# Patient Record
Sex: MALE | Race: ASIAN | Employment: UNEMPLOYED | URBAN - METROPOLITAN AREA
[De-identification: names, ages, dates, MRNs, and addresses within clinical notes are randomized per-mention and may not be internally consistent; named-entity substitution may affect disease eponyms.]

---
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
## 2023-08-15 ENCOUNTER — HOSPITAL ENCOUNTER (EMERGENCY)
Facility: HOSPITAL | Age: 18
Discharge: HOME | End: 2023-08-15
Attending: PEDIATRICS
Payer: MEDICAID

## 2023-08-15 VITALS
HEART RATE: 81 BPM | RESPIRATION RATE: 20 BRPM | DIASTOLIC BLOOD PRESSURE: 92 MMHG | SYSTOLIC BLOOD PRESSURE: 125 MMHG | TEMPERATURE: 98 F | BODY MASS INDEX: 23 KG/M2 | WEIGHT: 150 LBS | OXYGEN SATURATION: 100 %

## 2023-08-15 DIAGNOSIS — K04.7 DENTAL INFECTION: Primary | ICD-10-CM

## 2023-08-15 DIAGNOSIS — K08.409 HISTORY OF THIRD MOLAR TOOTH EXTRACTION, UNSPECIFIED EDENTULISM CLASS: ICD-10-CM

## 2023-08-15 PROCEDURE — 99283 EMERGENCY DEPT VISIT LOW MDM: CPT

## 2023-08-15 RX ORDER — AMOXICILLIN 500 MG/1
500 TABLET, FILM COATED ORAL 2 TIMES DAILY
Qty: 14 TABLET | Refills: 0 | Status: SHIPPED | OUTPATIENT
Start: 2023-08-15 | End: 2023-08-22

## 2023-08-15 NOTE — DISCHARGE INSTRUCTIONS
To treat for possible early infection, we will prescribe a course of antibiotics. Tylenol or Motrin as needed for pain.

## 2023-08-15 NOTE — ED INITIAL ASSESSMENT (HPI)
Called and spoke with patient. Informed of normal test results. Pt expressed understanding. Pt to the emergency room for right jaw problem. Pt states that 5 days ago he had 3 of his wisdom teeth removed (lower right, upper left, and lower left). Pt states that he was trying to look at his right side, because it hurt more than the others so he pulled his cheek out and it began to bleed. Pt is concerned that it is infected and that there is something wrong with it. No bleeding at this time. No fevers noted at home.

## 2024-02-13 ENCOUNTER — TELEMEDICINE (OUTPATIENT)
Facility: CLINIC | Age: 19
End: 2024-02-13
Payer: MEDICAID

## 2024-02-13 DIAGNOSIS — G47.10 HYPERSOMNIA: ICD-10-CM

## 2024-02-13 DIAGNOSIS — G47.33 OBSTRUCTIVE SLEEP APNEA: Primary | ICD-10-CM

## 2024-02-13 NOTE — PROGRESS NOTES
[facility] General Pulmonary Progress Note    History of Present Illness:  Spenser Nguyen is a 18 year old male   For over a year   Trouble falling asleep, has to listen to something other wise does not fall asleep  Despite of number of hours, dead tired, brain fog  In bed 12 wakes at 9-11  Spenser Nguyen is a 18 y.o. male with hx of GERD and anxiety presenting with concern for trouble falling asleep and staying asleep.     Past Medical History:   No past medical history on file.     Past Surgical History: No past surgical history on file.      Family Medical History: No family history on file.     Social History:   Social History     Socioeconomic History    Marital status: Single     Spouse name: Not on file    Number of children: Not on file    Years of education: Not on file    Highest education level: Not on file   Occupational History    Not on file   Tobacco Use    Smoking status: Never    Smokeless tobacco: Never   Substance and Sexual Activity    Alcohol use: Not on file    Drug use: Not on file    Sexual activity: Not on file   Other Topics Concern    Not on file   Social History Narrative    Not on file     Social Determinants of Health     Financial Resource Strain: Not on file   Food Insecurity: Not on file   Transportation Needs: Not on file   Physical Activity: Not on file   Stress: Not on file   Social Connections: Not on file   Housing Stability: Not on file        Medications:   Current Outpatient Medications   Medication Sig Dispense Refill    Esomeprazole Magnesium 40 MG Oral Capsule Delayed Release Take 40 mg by mouth every morning before breakfast.         Review of Systems: Review of Systems     Physical Exam:  There were no vitals taken for this visit.       Constitutional: alert, cooperative. No acute distress.  HEENT: Head NC/AT. Mask in place    Results:  Personally reviewed      Assessment/Plan:  1. Obstructive sleep apnea  Mild callie by his reports, order apap trial and followup 31-90 day  2.  Hypersomnia  No matter how much sleep he feels sleepy,  3. Insomnia --interrupted sleep, discussed variable schedule  And maintaining a consistent schedule        Annabella Taylor DO  2/13/2024    This visit is conducted using Telemedicine with live, interactive video and audio.    Patient has been referred to the [facility] website at [email or web address] to review the yearly Consent to Treat document.    Patient understands and accepts financial responsibility for any deductible, co-insurance and/or co-pays associated with this service.

## 2024-02-22 ENCOUNTER — TELEPHONE (OUTPATIENT)
Facility: CLINIC | Age: 19
End: 2024-02-22

## 2024-02-22 DIAGNOSIS — G47.33 OBSTRUCTIVE SLEEP APNEA: ICD-10-CM

## 2024-02-22 DIAGNOSIS — G47.10 HYPERSOMNIA: Primary | ICD-10-CM

## 2024-02-22 NOTE — TELEPHONE ENCOUNTER
Pt's prior sleep study report was received but is very blurry, we're awaiting a better copy but will be provided to Dr. Taylor for her review. Pt is seeking either a cpap titration or pap device, his request was unclear.

## 2024-02-23 NOTE — TELEPHONE ENCOUNTER
NOTE FROM DR TAYLOR  Pls order [id number]     Detailed mychart message sent, cpap machine ordered to [facility].  DME will verify insurance and once approved will contact pt to arrange delivery and instructions.  Pt instructed to follow up with Dr. Taylor once pt starts PAP therapy per insurance compliance requirement.   Pt to contact office with any additional questions or concerns.    [phone] (home)

## 2024-03-14 ENCOUNTER — TELEPHONE (OUTPATIENT)
Facility: CLINIC | Age: 19
End: 2024-03-14

## 2024-03-14 NOTE — TELEPHONE ENCOUNTER
Called Dr Holm's office to get office visit notes prior to pt's sleep study. Receptionis will review and fax medical record.  Will need to fax F2F notes to [facility] when received.

## 2024-04-03 ENCOUNTER — MED REC SCAN ONLY (OUTPATIENT)
Facility: CLINIC | Age: 19
End: 2024-04-03

## 2024-05-24 NOTE — PROGRESS NOTES
[facility] General Pulmonary Progress Note    History of Present Illness:  Spenser Nguyen is a 19 year old male   This is a 19 year old male who presents with the following symptoms, risk factors, behaviors or other items associated with sleep problems.    Sleep Apnea:   reflux during sleep; wakes with dry mouth; mouth breathing; nasal congestion; restless sleep; non-refreshing sleep; high blood pressure  Insomnia:  difficulty falling asleep; difficulty staying asleep; non-refreshing sleep; restless sleep; not getting enough sleep; irregular sleep schedule; mind racing; depresssion; anxiety; job stress  Restless Leg:  tingling or crawly feeling in the legs; urge to move is worse when seated or lying  Parasomnias:   No data recorded  Daytime Problems:  sleepiness; fatigue; irritable/freedman; dificulty concentrating; inattentiveness; memory problems; dozing off at school; previous sleep study    The patient's Conroy Sleepiness score is 0/24.  Since started using cpap  Waking up a lot easier  Still tired but a lot better  Able to take a nap now, before he was not able to  He is still sleepy  And lethargic  Stopped taking adderall and vyvance and nauseaus      Spenser Nguyen  03/15/2024 - 2024  Patient ID: [id number]  : 2005  Age: 19 years  Gender: Male  [address], [address]  Phone: [phone]  Fax: [phone]  Email: [email or web address]  Compliance Report  Compliance  Payor Standard  Usage 03/15/2024 - 2024  Usage days 70/70 days (100%)  >= 4 hours 52 days (74%)  < 4 hours 18 days (26%)  Usage hours 410 hours 48 minutes  Average usage (total days) 5 hours 52 minutes  Average usage (days used) 5 hours 52 minutes  Median usage (days used) 6 hours 12 minutes  Total used hours (value since last reset - 2024) 410 hours  AirSense 11 AutoSet  Serial number [id number]  Mode AutoSet  Min Pressure 5 cmH2O  Max Pressure 15 cmH2O  EPR Fulltime  EPR level 1  Response  Standard  Therapy  Pressure - cmH2O Median: 5.6 95th percentile: 7.7 Maximum: 8.7  Leaks - L/min Median: 0.0 95th percentile: 3.4 Maximum: 20.4  Events per hour AI: 2.2 HI: 0.1 AHI: 2.3  Apnea Index Central: 1.9 Obstructive: 0.2 Unknown: 0.1  RERA Index 0.0  Cheyne-Vernon respiration (average duration per night) 0 minutes (0%)      Past Medical History:   History reviewed. No pertinent past medical history.     Past Surgical History: History reviewed. No pertinent surgical history.      Family Medical History: History reviewed. No pertinent family history.     Social History:   Social History     Socioeconomic History    Marital status: Single     Spouse name: Not on file    Number of children: Not on file    Years of education: Not on file    Highest education level: Not on file   Occupational History    Not on file   Tobacco Use    Smoking status: Never    Smokeless tobacco: Never   Substance and Sexual Activity    Alcohol use: Not on file    Drug use: Not on file    Sexual activity: Not on file   Other Topics Concern    Not on file   Social History Narrative    Not on file     Social Determinants of Health     Financial Resource Strain: Not on file   Food Insecurity: Not on file   Transportation Needs: Not on file   Physical Activity: Not on file   Stress: Not on file   Social Connections: Unknown (3/14/2021)    Received from [facility], [facility]    Social Connections     Conversations with friends/family/neighbors per week: Not on file   Housing Stability: Low Risk  (7/9/2021)    Received from [facility], [facility]    Housing Stability     Mortgage Payment Concerns?: Not on file     Number of Places Lived in the Last Year: Not on file     Unstable Housing?: Not on file        Medications:   Current Outpatient Medications   Medication Sig Dispense Refill    Esomeprazole Magnesium 40 MG Oral Capsule Delayed Release Take 40 mg by  mouth every morning before breakfast.         Review of Systems: Review of Systems     Physical Exam:  There were no vitals taken for this visit.       Constitutional: alert, cooperative. No acute distress.  HEENT: Head NC/AT. Mask in place    Results:  Personally reviewed      Assessment/Plan:  1. Obstructive sleep apnea  Responding well to cpap, about 40-50% improvement    2. Hypersomnia  Some improvement   Did not tolerate vyvanse or adderall  Consider adding modafinil to discuss with his doctor  3. Gastroesophageal reflux disease without esophagitis        Annabella Taylor DO  5/28/2024    This visit is conducted using Telemedicine with live, interactive video and audio.    Patient has been referred to the [facility] website at [email or web address] to review the yearly Consent to Treat document.    Patient understands and accepts financial responsibility for any deductible, co-insurance and/or co-pays associated with this service.

## 2024-05-28 ENCOUNTER — TELEMEDICINE (OUTPATIENT)
Facility: CLINIC | Age: 19
End: 2024-05-28

## 2024-05-28 DIAGNOSIS — K21.9 GASTROESOPHAGEAL REFLUX DISEASE WITHOUT ESOPHAGITIS: ICD-10-CM

## 2024-05-28 DIAGNOSIS — G47.33 OBSTRUCTIVE SLEEP APNEA: Primary | ICD-10-CM

## 2024-05-28 DIAGNOSIS — G47.10 HYPERSOMNIA: ICD-10-CM

## 2024-05-28 PROCEDURE — 99214 OFFICE O/P EST MOD 30 MIN: CPT | Performed by: OTHER

## 2024-09-23 NOTE — PROGRESS NOTES
[facility] PULMONARY  SLEEP PROGRESS NOTE        HPI:   This is a 19 year old male coming in for   Chief Complaint   Patient presents with    Obstructive Sleep Apnea (KAREN)     Dme: [facility]  Nasal mask       HPI: over the past couple of years  Extreme fatigue  Torture waking  Tired all day   No motivation to do anything  Sleeps through the night  Goes to bed 1-2 am, SL 1 hour, wakes around 3am or 5am and goes back to sleep  OOB 10-noon   No regular dreams  No sleep paralysis  Some daytime naps if had to wake early  Denies drowsy driving  He is not on medications currently    Spenser Nguyen  2024 - 2024  Patient ID: [id number]  : 2005  Age: 19 years  Gender: Male  [place]  [address]  [facility], [address]  Phone: [phone]  Fax: [phone]  Email: [email or web address]  Compliance Report  Compliance  Payor Standard  Usage 2024 - 2024  Usage days 77/90 days (86%)  >= 4 hours 57 days (63%)  < 4 hours 20 days (22%)  Usage hours 403 hours 44 minutes  Average usage (total days) 4 hours 29 minutes  Average usage (days used) 5 hours 15 minutes  Median usage (days used) 5 hours 38 minutes  Total used hours (value since last reset - 2024) 1,008 hours  AirSense 11 AutoSet  Serial number [id number]  Mode AutoSet  Min Pressure 5 cmH2O  Max Pressure 15 cmH2O  EPR Fulltime  EPR level 1  Response Standard  Therapy  Pressure - cmH2O Median: 5.6 95th percentile: 7.7 Maximum: 9.3  Leaks - L/min Median: 0.1 95th percentile: 4.5 Maximum: 16.8  Events per hour AI: 1.7 HI: 0.2 AHI: 1.9  Apnea Index Central: 1.4 Obstructive: 0.2 Unknown: 0.0  RERA Index 0.0  Cheyne-Vernon respiration (average duration per night) 0 minutes (0%)  Patient: Sleep review of systems today: see form.      Pt  PCP:  EUGENE ROCHA  No referring provider defined for this encounter.           No data to display                    History reviewed. No pertinent past medical history.  History reviewed. No pertinent  surgical history.  Social History:  Social History     Social History Narrative    Not on file     Social History     Socioeconomic History    Marital status: Single   Tobacco Use    Smoking status: Never    Smokeless tobacco: Never     Social Determinants of Health      Received from [facility], [facility]    Social Connections    Received from [facility], [facility]    Housing Stability     Family History:  History reviewed. No pertinent family history.  Allergies:  No Known Allergies  Current Meds:  Current Outpatient Medications   Medication Sig Dispense Refill    Esomeprazole Magnesium 40 MG Oral Capsule Delayed Release Take 40 mg by mouth every morning before breakfast. (Patient not taking: Reported on 9/24/2024)        Counseling given: Not Answered         Problem List:  Patient Active Problem List   Diagnosis    Obstructive sleep apnea    Hypersomnia       REVIEW OF SYSTEMS:   Review of Systems    EXAM:   /68 (BP Location: Right arm, Patient Position: Sitting, Cuff Size: adult)   Pulse 76   Resp 16   Ht 5' 9\" (1.753 m)   Wt 163 lb (73.9 kg)   SpO2 100%   BMI 24.07 kg/m²  Estimated body mass index is 24.07 kg/m² as calculated from the following:    Height as of this encounter: 5' 9\" (1.753 m).    Weight as of this encounter: 163 lb (73.9 kg).   Neck in inches:      Wt Readings from Last 6 Encounters:   09/24/24 163 lb (73.9 kg)   08/15/23 150 lb (68 kg)   09/06/19 125 lb (56.7 kg) (28%, Z= -0.58)*     * Growth percentiles are based on [facility] (Boys, 2-20 Years) data.     BP Readings from Last 3 Encounters:   09/24/24 112/68   08/15/23 (!) 125/92   09/07/19 110/68 (35%, Z = -0.39 /  59%, Z = 0.23)*     *BP percentiles are based on the 2017 AAP Clinical Practice Guideline for boys     Pulse Readings from Last 3 Encounters:   09/24/24 76   08/15/23 81   09/07/19 70     SpO2 Readings from Last 3 Encounters:   09/24/24 100%    08/15/23 100%   09/07/19 100%      Patient weight not recorded    Vital signs reviewed.  Physical Exam    ASSESSMENT AND PLAN:   1. Hypersomnia  Perform MSLT after cpap titration study  Followup to review  2. Obstructive sleep apnea  Mild overall, RDI severe in REM 37  Not responding to cpap  No significant improvement in daytime fatigue  There are no Patient Instructions on file for this visit.    Independent interpretation of Sleep Download as defined above.  Continue with Rx management of Sleep apnea with PAP therapy.    COMPLIANCE is required by insurance for 4 hours a night most nights of the week.    Advised if still with sleep apnea and not using CPAP has a 7 fold increase in risk of heart attack, stroke, abnormal heart rhythm  and death,  increased risk of driving accidents.     Advised to refrain from driving when sleepy.      Recommend weight loss, and maintain and optimal BMI with Exercise 30 minutes most days to target heart rate .     Advised patient to change filters,masks,hoses  and tubes and equiptment on a  regular schedule.    Filters and seals shall be changed every 1 month,  Hoses every 3 months,   CPAP mask and humidifier chamber changed every 6 month  with the durable medical equipment provider.         Meds & Refills for this Visit:  Requested Prescriptions      No prescriptions requested or ordered in this encounter       Outcome: Parent verbalizes understanding. Parent is notified to call with any questions, complications, allergies, or worsening or changing symptoms.  Parent is to call with any side effects or complications from the treatments as a result of today.     \" This note was created utilizing Dragon speech recognition software.  Please excuse any grammatical errors. Call my office if you have any questions regarding this note. \"     Paris GIBSON LPN  9/23/2024  2:02 PM

## 2024-09-24 ENCOUNTER — OFFICE VISIT (OUTPATIENT)
Facility: CLINIC | Age: 19
End: 2024-09-24
Payer: MEDICAID

## 2024-09-24 VITALS
WEIGHT: 163 LBS | DIASTOLIC BLOOD PRESSURE: 68 MMHG | OXYGEN SATURATION: 100 % | RESPIRATION RATE: 16 BRPM | HEIGHT: 69 IN | BODY MASS INDEX: 24.14 KG/M2 | SYSTOLIC BLOOD PRESSURE: 112 MMHG | HEART RATE: 76 BPM

## 2024-09-24 DIAGNOSIS — G47.33 OBSTRUCTIVE SLEEP APNEA: ICD-10-CM

## 2024-09-24 DIAGNOSIS — G47.10 HYPERSOMNIA: Primary | ICD-10-CM

## 2024-09-24 PROCEDURE — 99214 OFFICE O/P EST MOD 30 MIN: CPT | Performed by: OTHER

## 2024-11-01 ENCOUNTER — TELEPHONE (OUTPATIENT)
Facility: CLINIC | Age: 19
End: 2024-11-01

## 2024-11-04 NOTE — TELEPHONE ENCOUNTER
[employer] sent fax to billing \"denying due to lack of prior testing claiming they didn't receive evidence he's already been diagnosed and treated for KAREN\" so the  will contact the insurer to re-address the records.    Pt is aware we're working on this. Given the test is this Wed night and tomorrow most facilities are closed for Election Day we may not have an answer until the day of the testing. Pt is aware of this.

## 2024-11-06 ENCOUNTER — TELEPHONE (OUTPATIENT)
Dept: SLEEP CENTER | Age: 19
End: 2024-11-06

## 2024-11-06 ENCOUNTER — PATIENT MESSAGE (OUTPATIENT)
Facility: CLINIC | Age: 19
End: 2024-11-06

## 2024-11-07 ENCOUNTER — TELEPHONE (OUTPATIENT)
Dept: SLEEP CENTER | Age: 19
End: 2024-11-07

## 2024-11-14 ENCOUNTER — MED REC SCAN ONLY (OUTPATIENT)
Facility: CLINIC | Age: 19
End: 2024-11-14

## 2024-11-20 ENCOUNTER — TELEPHONE (OUTPATIENT)
Facility: CLINIC | Age: 19
End: 2024-11-20

## 2024-11-21 NOTE — TELEPHONE ENCOUNTER
Nurse s/w pt, informed pt to follow up with [facility] to obtain status of his MSLT.  Pt verbalize understanding and agree.

## 2024-12-03 ENCOUNTER — TELEPHONE (OUTPATIENT)
Dept: SLEEP CENTER | Age: 19
End: 2024-12-03

## 2024-12-04 ENCOUNTER — TELEPHONE (OUTPATIENT)
Facility: CLINIC | Age: 19
End: 2024-12-04

## 2024-12-04 NOTE — TELEPHONE ENCOUNTER
Pt lvm stating insurance company denied MSLT.  Pt wants to know what is next step?    [phone] (home)

## 2024-12-09 NOTE — TELEPHONE ENCOUNTER
Pt wants to get in sooner to discuss what is next step of his sleep issues treatment.  Where can pt be scheduled? Can he be seen by Gala?    [phone] (home)      108

## 2024-12-09 NOTE — PROGRESS NOTES
[facility] PULMONARY  SLEEP PROGRESS NOTE        HPI:   This is a 19 year old male coming in for   Chief Complaint   Patient presents with    Obstructive Sleep Apnea (KAREN)     Pt here to discuss options regarding denial of sleep tests.        HPI:     Has been struggling with difficulty falling asleep and feeling very tired in the day for the last 3 years since he was 16  Gets into bed at 1-2am  Cannot fall asleep if he gets into bed any earlier  Wakes up once at night, often for no reason  No matter how many hours he does sleep, feels tired when waking up  If he tries to wake up between 6-8am, feels like he cannot function  Wakes up at 9-10am for school- after 7-8 hours of sleep - still feels tired, lethargic, lots of brain fog, difficulty concentrating. Can take up to an hour before he feels more awake  No energy to do anything including hobbies and work  He is in community college  Having trouble focusing in class, able to stay awake though challenging  Feels he needs a nap but he is incapable of taking a nap  Struggling with grades due to his fatigue  Caffeine - 1 energy drink daily, 1 can coffee daily, no tea, no soda    In high school-   Sleep hours 11p/12a - 6/7a, still felt tired but felt like he had extra energy and was able to push through  Still took 30-60 min to fall asleep  Has never been able to fall asleep quickly    Had HST in 02/2024 showing mild sleep apnea  Using CPAP consistently for about 6-7 months  Felt about 20% improvement initially but afterwards felt he went back to his baseline sleep and energy levels  Disliked using it, never felt like he got used to it despite consistent usage     Patient: Sleep review of systems today: see form.    Ridgeway score: 9/24    Denies leg cramps, restless legs, headaches, dry mouth, sleep walking, sleep paralysis, sleep attacks, cataplexy  Grinds teeth - wears dental guard  Mouth is dry in AM  Believes he doesn't snore   Drowsy when driving over an hour  Has not  fallen asleep while driving but has come close several time  Believes he sleep talks    Had very thorough lab work up with PCP in  prior to establishing with sleep med  Was told that his thyroid was normal, no anemia, CMP unremarkable, inflammatory markers normal,   Vitamin D was low - has not taken supplements     Was diagnosed with ADHD by psychiatry  Tried Adderall and Vyvanse  Did not help with his concentration or energy levels  Made him nauseous     DME company: [employer]  Mask type: nasal pillows     Spenser Nguyen  2024 - 2024  Patient ID: [id number]  : 2005  Age: 19 years  Gender: Male  [facility]  [facility], [address]  Phone: [phone]  Fax: [phone]  Email: [email or web address]  Compliance Report  Compliance  Payor Standard  Usage 2024 - 2024  Usage days 30/30 days (100%)  >= 4 hours 25 days (83%)  < 4 hours 5 days (17%)  Usage hours 168 hours 43 minutes  Average usage (total days) 5 hours 37 minutes  Average usage (days used) 5 hours 37 minutes  Median usage (days used) 5 hours 55 minutes  Total used hours (value since last reset - 2024) 1,008 hours  AirSense 11 AutoSet  Serial number [id number]  Mode AutoSet  Min Pressure 5 cmH2O  Max Pressure 15 cmH2O  EPR Fulltime  EPR level 1  Response Standard  Therapy  Pressure - cmH2O Median: 5.5 95th percentile: 7.9 Maximum: 10.1  Leaks - L/min Median: 0.0 95th percentile: 3.9 Maximum: 18.4  Events per hour AI: 2.0 HI: 0.1 AHI: 2.1  Apnea Index Central: 1.6 Obstructive: 0.3 Unknown: 0.0  RERA Index 0.0  Cheyne-Vernon respiration (average duration per night) 0 minutes (0%)      2024 HST  AHI 8.8  REM AHI 16.9  O2 latrice 88%      Last CBC  No results found for: \"WBC\", \"RBC\", \"HGB\", \"HCT\", \"MCV\", \"MCH\", \"MCHC\", \"RDW\", \"PLT\", \"MPV\"     Last CMP/BMP  No results found for: \"GLU\", \"BUN\", \"BUNCREA\", \"CREATSERUM\", \"ANIONGAP\", \"GFR\", \"GFRNAA\", \"GFRAA\", \"CA\", \"OSMOCALC\", \"ALKPHO\", \"AST\", \"ALT\", \"ALKPHOS\",  \"BILT\", \"TP\", \"ALB\", \"GLOBULIN\", \"AGRATIO\", \"NA\", \"K\", \"CL\", \"CO2\"    Last iron panel  No results found for: \"IRON\", \"IRONTOT\"  No results found for: \"JESSICA\"      Pt  PCP:  Jared Holm V  No referring provider defined for this encounter.           No data to display                  History reviewed. No pertinent past medical history.  History reviewed. No pertinent surgical history.  Social History:  Social History     Social History Narrative    Not on file     Social History     Socioeconomic History    Marital status: Single   Tobacco Use    Smoking status: Never    Smokeless tobacco: Never     Social Drivers of Health      Received from [facility], [facility]    Social Connections    Received from [facility], [facility]    Housing Stability     Family History:  History reviewed. No pertinent family history.  Allergies:  Allergies[1]  Current Meds:  Current Outpatient Medications   Medication Sig Dispense Refill    Esomeprazole Magnesium 40 MG Oral Capsule Delayed Release Take 40 mg by mouth every morning before breakfast. (Patient not taking: Reported on 12/10/2024)        Counseling given: Not Answered         Problem List:  Patient Active Problem List   Diagnosis    Obstructive sleep apnea    Hypersomnia       REVIEW OF SYSTEMS:   Review of Systems  See HPI    EXAM:   /66   Pulse 61   Resp 16   Ht 5' 9\" (1.753 m)   Wt 161 lb (73 kg)   SpO2 97%   BMI 23.78 kg/m²  Estimated body mass index is 23.78 kg/m² as calculated from the following:    Height as of this encounter: 5' 9\" (1.753 m).    Weight as of this encounter: 161 lb (73 kg).   Neck in inches:      Wt Readings from Last 6 Encounters:   12/10/24 161 lb (73 kg)   09/24/24 163 lb (73.9 kg)   08/15/23 150 lb (68 kg)   09/06/19 125 lb (56.7 kg) (28%, Z= -0.58)*     * Growth percentiles are based on CDC (Boys, 2-20 Years) data.     BP Readings from Last 3 Encounters:    12/10/24 106/66   09/24/24 112/68   08/15/23 (!) 125/92     Pulse Readings from Last 3 Encounters:   12/10/24 61   09/24/24 76   08/15/23 81     SpO2 Readings from Last 3 Encounters:   12/10/24 97%   09/24/24 100%   08/15/23 100%      Patient weight not recorded    Vital signs reviewed.  Physical Exam  Vitals and nursing note reviewed.   Constitutional:       Appearance: Normal appearance.   HENT:      Head: Normocephalic and atraumatic.      Right Ear: External ear normal.      Left Ear: External ear normal.      Mouth/Throat:      Comments: Mallampatti II, good movement of mandible  Pulmonary:      Effort: Pulmonary effort is normal. No respiratory distress.   Musculoskeletal:      Cervical back: Normal range of motion and neck supple.   Neurological:      General: No focal deficit present.      Mental Status: He is alert and oriented to person, place, and time.   Psychiatric:         Attention and Perception: Attention and perception normal.         Mood and Affect: Mood and affect normal.         Speech: Speech normal.         Behavior: Behavior normal. Behavior is cooperative.         Thought Content: Thought content normal.         Cognition and Memory: Cognition and memory normal.         Judgment: Judgment normal.         ASSESSMENT AND PLAN:   1. Obstructive sleep apnea  - Restart CPAP    2. Hypersomnia  - MSLT was denied  - Start modafinil 100 BID  - RTO in 1 month for follow up    3. Delayed sleep phase syndrome  - Work on shifting sleep schedule with melatonin and light therapy  - Goal of shifting 30 min every month    There are no Patient Instructions on file for this visit.    Independent interpretation of Sleep Download as defined above.  Continue with Rx management of Sleep apnea with PAP therapy.    COMPLIANCE is required by insurance for 4 hours a night most nights of the week.    Advised if still with sleep apnea and not using CPAP has a 7 fold increase in risk of heart attack, stroke, abnormal  heart rhythm  and death,  increased risk of driving accidents.     Advised to refrain from driving when sleepy.      Recommend weight loss, and maintain and optimal BMI with Exercise 30 minutes most days to target heart rate .     Advised patient to change filters,masks,hoses  and tubes and equiptment on a  regular schedule.    Filters and seals shall be changed every 1 month,  Hoses every 3 months,   CPAP mask and humidifier chamber changed every 6 month  with the durable medical equipment provider.         Meds & Refills for this Visit:  Requested Prescriptions      No prescriptions requested or ordered in this encounter       Outcome: Parent verbalizes understanding. Parent is notified to call with any questions, complications, allergies, or worsening or changing symptoms.  Parent is to call with any side effects or complications from the treatments as a result of today.     \" This note was created utilizing Dragon speech recognition software.  Please excuse any grammatical errors. Call my office if you have any questions regarding this note. \"     Gala Yo PA-C  12/9/2024  3:44 PM         [1] No Known Allergies

## 2024-12-10 ENCOUNTER — OFFICE VISIT (OUTPATIENT)
Facility: CLINIC | Age: 19
End: 2024-12-10
Payer: MEDICAID

## 2024-12-10 VITALS
OXYGEN SATURATION: 97 % | SYSTOLIC BLOOD PRESSURE: 106 MMHG | DIASTOLIC BLOOD PRESSURE: 66 MMHG | WEIGHT: 161 LBS | BODY MASS INDEX: 23.85 KG/M2 | HEIGHT: 69 IN | HEART RATE: 61 BPM | RESPIRATION RATE: 16 BRPM

## 2024-12-10 DIAGNOSIS — G47.33 OBSTRUCTIVE SLEEP APNEA: Primary | ICD-10-CM

## 2024-12-10 DIAGNOSIS — G47.21 DELAYED SLEEP PHASE SYNDROME: ICD-10-CM

## 2024-12-10 DIAGNOSIS — G47.10 HYPERSOMNIA: ICD-10-CM

## 2024-12-10 PROCEDURE — 99214 OFFICE O/P EST MOD 30 MIN: CPT | Performed by: PHYSICIAN ASSISTANT

## 2024-12-11 RX ORDER — MODAFINIL 100 MG/1
100 TABLET ORAL 2 TIMES DAILY
Qty: 60 TABLET | Refills: 2 | Status: SHIPPED | OUTPATIENT
Start: 2024-12-11

## 2025-01-27 ENCOUNTER — OFFICE VISIT (OUTPATIENT)
Facility: CLINIC | Age: 20
End: 2025-01-27
Payer: MEDICAID

## 2025-01-27 VITALS
WEIGHT: 156 LBS | HEIGHT: 69 IN | SYSTOLIC BLOOD PRESSURE: 98 MMHG | HEART RATE: 78 BPM | BODY MASS INDEX: 23.11 KG/M2 | DIASTOLIC BLOOD PRESSURE: 50 MMHG | OXYGEN SATURATION: 98 % | RESPIRATION RATE: 16 BRPM

## 2025-01-27 DIAGNOSIS — G47.33 OBSTRUCTIVE SLEEP APNEA: Primary | ICD-10-CM

## 2025-01-27 DIAGNOSIS — G47.10 HYPERSOMNIA: ICD-10-CM

## 2025-01-27 PROCEDURE — 99214 OFFICE O/P EST MOD 30 MIN: CPT | Performed by: OTHER

## 2025-01-27 NOTE — PROGRESS NOTES
[facility] PULMONARY  SLEEP PROGRESS NOTE        HPI:   This is a 19 year old male coming in for   Chief Complaint   Patient presents with    Apnea     Difficulty falling asleep with cpap nasal mask .  Mask comes off in the middle of the night and not replacing it back.        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  When he is active his energy is up  Goes back home eats and sleeps   Didn't go to college    Mood--hopeless, down in dumps  Does not want to take antidepressants  His fatigue is affecting him greatly    Sleepy while driving      Compliance  Payor Standard  Usage 10/28/2024 - 01/25/2025  Usage days 33/90 days (37%)  >= 4 hours 1 days (1%)  < 4 hours 32 days (36%)  Usage hours 75 hours 48 minutes  Average usage (total days) 51 minutes  Average usage (days used) 2 hours 18 minutes  Median usage (days used) 2 hours 8 minutes  Total used hours (value since last reset - 01/25/2025) 1,097 hours  AirSense 11 AutoSet  Serial number [id number]  Mode AutoSet  Min Pressure 5 cmH2O  Max Pressure 15 cmH2O  EPR Fulltime  EPR level 1  Response Standard  Therapy  Pressure - cmH2O Median: 5.3 95th percentile: 6.6 Maximum: 7.2  Leaks - L/min Median: 0.0 95th percentile: 4.4 Maximum: 15.8  Events per hour AI: 0.9 HI: 0.1 AHI: 1.0  Apnea Index Central: 0.6 Obstructive: 0.2 Unknown: 0.1  RERA Index 0.0      Patient: Sleep review of systems today: see form.      Pt  PCP:  Jared Holm V  No referring provider defined for this encounter.           No data to display                    Past Medical History:    Broken nose    GERD (gastroesophageal reflux disease)    Sleep apnea     History reviewed. No pertinent surgical history.  Social History:  Social History      Social History Narrative    Not on file     Social History     Socioeconomic History    Marital status: Single   Tobacco Use    Smoking status: Never    Smokeless tobacco: Never     Social Drivers of Health     Physical Activity: Sufficiently Active (12/17/2024)    Received from [facility]    Physical Activity     On average, how many days per week do you engage in moderate to strenuous exercise (like a brisk walk)?: 3 days     On average, how many minutes do you engage in exercise at this level?: 60 min     On average, how many minutes do you engage in exercise at this level?: 60 min     On average, how many days per week do you engage in moderate to strenuous exercise (like a brisk walk)?: 3 days    [facility] from [facility], [facility]    Social Connections    Received from [facility], [facility]    Housing Stability     Family History:  Family History   Problem Relation Age of Onset    Heart Disorder Father     Dementia Father     Diabetes Mother      Allergies:  Allergies[1]  Current Meds:  Current Outpatient Medications   Medication Sig Dispense Refill    modafinil 100 MG Oral Tab Take 1 tablet (100 mg total) by mouth 2 (two) times daily. Take 1 tab right away in the morning and 2nd tab in early afternoon if needed 60 tablet 2    Esomeprazole Magnesium 40 MG Oral Capsule Delayed Release Take 40 mg by mouth every morning before breakfast. (Patient not taking: Reported on 1/27/2025)        Counseling given: Not Answered         Problem List:  Patient Active Problem List   Diagnosis    Obstructive sleep apnea    Hypersomnia       REVIEW OF SYSTEMS:   Review of Systems    EXAM:   BP 98/50 (BP Location: Right arm, Patient Position: Sitting, Cuff Size: adult)   Pulse 78   Resp 16   Ht 5' 9\" (1.753 m)   Wt 156 lb (70.8 kg)   SpO2 98%   BMI 23.04 kg/m²  Estimated body mass index is 23.04 kg/m² as calculated from the following:     Height as of this encounter: 5' 9\" (1.753 m).    Weight as of this encounter: 156 lb (70.8 kg).   Neck in inches:      Wt Readings from Last 6 Encounters:   01/27/25 156 lb (70.8 kg)   12/10/24 161 lb (73 kg)   09/24/24 163 lb (73.9 kg)   08/15/23 150 lb (68 kg)   09/06/19 125 lb (56.7 kg) (28%, Z= -0.58)*     * Growth percentiles are based on CDC (Boys, 2-20 Years) data.     BP Readings from Last 3 Encounters:   01/27/25 98/50   12/10/24 106/66   09/24/24 112/68     Pulse Readings from Last 3 Encounters:   01/27/25 78   12/10/24 61   09/24/24 76     SpO2 Readings from Last 3 Encounters:   01/27/25 98%   12/10/24 97%   09/24/24 100%      Ideal body weight: 70.7 kg (155 lb 13.8 oz)  Adjusted ideal body weight: 70.7 kg (155 lb 14.7 oz)    Vital signs reviewed.  Physical Exam    ASSESSMENT AND PLAN:   1. Obstructive sleep apnea  Not tolerating therapy and not sure he benefited in a significant way  Consider dental device to replace    His apnea was mild , he still has hypersomnia  2. Hypersomnia  Should be investigated further  Patient needs dx and MSLT  May need peer to peer    ESS 15  There are no Patient Instructions on file for this visit.    Independent interpretation of Sleep Download as defined above.  Continue with Rx management of Sleep apnea with PAP therapy.    COMPLIANCE is required by insurance for 4 hours a night most nights of the week.    Advised if still with sleep apnea and not using CPAP has a 7 fold increase in risk of heart attack, stroke, abnormal heart rhythm  and death,  increased risk of driving accidents.     Advised to refrain from driving when sleepy.      Recommend weight loss, and maintain and optimal BMI with Exercise 30 minutes most days to target heart rate .     Advised patient to change filters,masks,hoses  and tubes and equiptment on a  regular schedule.    Filters and seals shall be changed every 1 month,  Hoses every 3 months,   CPAP mask and humidifier chamber changed every 6  month  with the durable medical equipment provider.         Meds & Refills for this Visit:  Requested Prescriptions      No prescriptions requested or ordered in this encounter       Outcome: Parent verbalizes understanding. Parent is notified to call with any questions, complications, allergies, or worsening or changing symptoms.  Parent is to call with any side effects or complications from the treatments as a result of today.     \" This note was created utilizing Dragon speech recognition software.  Please excuse any grammatical errors. Call my office if you have any questions regarding this note. \"     Hilario DOWNEY RT  1/27/2025  12:15 PM         [1] No Known Allergies

## 2025-01-28 ENCOUNTER — TELEPHONE (OUTPATIENT)
Dept: SLEEP CENTER | Age: 20
End: 2025-01-28

## 2025-01-28 ENCOUNTER — PATIENT MESSAGE (OUTPATIENT)
Dept: PHYSICAL THERAPY | Age: 20
End: 2025-01-28

## 2025-03-11 ENCOUNTER — OFFICE VISIT (OUTPATIENT)
Facility: LOCATION | Age: 20
End: 2025-03-11

## 2025-03-11 VITALS — WEIGHT: 154.63 LBS | BODY MASS INDEX: 22.9 KG/M2 | HEIGHT: 69 IN

## 2025-03-11 DIAGNOSIS — J34.2 DEVIATED NASAL SEPTUM: Primary | ICD-10-CM

## 2025-03-11 DIAGNOSIS — G47.33 OBSTRUCTIVE SLEEP APNEA: ICD-10-CM

## 2025-03-11 DIAGNOSIS — R09.81 NASAL CONGESTION: ICD-10-CM

## 2025-03-11 DIAGNOSIS — J34.3 HYPERTROPHY OF NASAL TURBINATES: ICD-10-CM

## 2025-03-11 DIAGNOSIS — J34.89 NASAL OBSTRUCTION: ICD-10-CM

## 2025-03-11 PROCEDURE — 31231 NASAL ENDOSCOPY DX: CPT | Performed by: STUDENT IN AN ORGANIZED HEALTH CARE EDUCATION/TRAINING PROGRAM

## 2025-03-11 PROCEDURE — 99204 OFFICE O/P NEW MOD 45 MIN: CPT | Performed by: STUDENT IN AN ORGANIZED HEALTH CARE EDUCATION/TRAINING PROGRAM

## 2025-03-11 RX ORDER — HYDROCORTISONE ACETATE 25 MG
SUPPOSITORY, RECTAL RECTAL
COMMUNITY
Start: 2025-03-06

## 2025-03-11 NOTE — PROGRESS NOTES
[place]  OTOLARYNGOLOGY - HEAD & NECK SURGERY    3/11/2025     Reason for Consultation:   Chronic nasal congestion, lightheadedness, sleep issues    History of Present Illness:   Patient is a pleasant 19 year old male who is being seen for issues with difficulty breathing.  The patient states that he tends to mouth breathe and has difficulty breathing through his nose.  Does have a history of seasonal allergies.  Has not been on any topical therapy for his nose.  The patient states that at times he feels he is not able to get a deep enough breath.  This makes him lightheaded.  He also has had a history of sleep apnea.  His last sleep study was approximately a year ago which showed mild to moderate obstructive sleep apnea.  He does notice that he has had difficulty breathing from his nose and has had a history of nasal trauma and is wondering if there is a deviated septum that could be causing part of his problem.  Denies any facial pressure or pain.    Past Medical History  Past Medical History:    Broken nose    GERD (gastroesophageal reflux disease)    Sleep apnea       Past Surgical History  History reviewed. No pertinent surgical history.    Family History  Family History   Problem Relation Age of Onset    Heart Disorder Father     Dementia Father     Diabetes Mother        Social History  Pediatric History   Patient Parents    TC VIERA (Father)     Other Topics Concern    Not on file   Social History Narrative    Not on file           Current Medications:  Current Outpatient Medications   Medication Sig Dispense Refill    ANUCORT-HC 25 MG Rectal Suppos INSERT 1 SUPPOSITORY RECTALLY EVERY MORNING AND BEFORE BEDTIME (Patient not taking: Reported on 3/11/2025)      modafinil 100 MG Oral Tab Take 1 tablet (100 mg total) by mouth 2 (two) times daily. Take 1 tab right away in the morning and 2nd tab in early afternoon if needed (Patient not taking: Reported on 3/11/2025) 60 tablet 2    Esomeprazole Magnesium 40  MG Oral Capsule Delayed Release Take 40 mg by mouth every morning before breakfast. (Patient not taking: Reported on 3/11/2025)         Allergies  Allergies[1]    Review of Systems:   A comprehensive 10 point review of systems was completed.  Pertinent positives and negatives noted in the the HPI.    Physical Exam:   Height 5' 9\" (1.753 m), weight 154 lb 9.6 oz (70.1 kg).    GENERAL: No acute distress, Comfortable appearing  FACE: HB 1/6, Normal Animation  HEAD: Normocephalic  EYES: EOMI, pupils equil  EARS: Bilateral Auricles Symmetric  NOSE: Nares patent bilaterally  ORAL CAVITY: Tongue mobile, Oropharynx clear, Floor of mouth clear, Posterior oropharynx normal  NECK: No palpable lymphadenopathy, thyroid not palpable, nontender    PROCEDURE: BILATERAL RIGID NASAL ENDOSCOPY  Bilateral rigid nasal endoscopy (32224) was performed. Verbal consent was obtained from the patient to proceed with rigid nasal endoscopy.  A rigid 4mm 30 degree nasal endoscope was used to examine both nasal cavities. The inferior meatus, inferior turbinate, nasopharynx, middle meatus, middle turbinate, superior meatus, superior turbinate, and sphenoethmoidal recess were examined bilaterally and deemed to be normal, with any exceptions as noted below. At the completion of the procedure the endoscope was removed. The patient tolerated the procedure well. There were no complications.    Findings: The bilateral inferior turbinates were enlarged especially on the left. The Septum was deviated to the right caudally, but this was mild. The middle meatus was patent bilateral without any pus drainage or edema. There were no obvious masses or polyps noted.    Results:     Laboratory Data:  No results found for: \"WBC\", \"HGB\", \"HCT\", \"PLT\", \"CREATSERUM\", \"BUN\", \"NA\", \"K\", \"CL\", \"CO2\", \"GLU\", \"CA\", \"ALB\", \"ALKPHO\", \"TP\", \"AST\", \"ALT\", \"PTT\", \"INR\", \"PTP\", \"T4F\", \"TSH\", \"TSHREFLEX\", \"MAHOGANY\", \"LIP\", \"GGT\", \"PSA\", \"DDIMER\", \"ESRML\", \"ESRPF\", \"CRP\", \"BNP\",  \"MG\", \"PHOS\", \"TROP\", \"CK\", \"CKMB\", \"BISHNU\", \"RPR\", \"B12\", \"ETOH\", \"POCGLU\"      Imaging:  No results found.      Impression:       ICD-10-CM    1. Deviated nasal septum  J34.2       2. Hypertrophy of nasal turbinates  J34.3       3. Nasal congestion  R09.81       4. Nasal obstruction  J34.89       5. Obstructive sleep apnea  G47.33            Recommendations:  I would like to treat the patient with a combination of Flonase and azelastine.  He will trial this for 4 weeks.  He will return to see me if he has any persistent problems with nasal congestion.  If he does we may trial him another allergy medications.    Thank you for allowing me to participate in the care of your patient.    Jorgito Hwang, DO   Otolaryngology/Rhinology, Sinus, and Endoscopic Skull Base Surgery  [address] [place] [address]  Phone [phone]  Fax [phone]  3/11/2025  2:26 PM  3/11/2025          [1]   Allergies  Allergen Reactions    Pollen Extract OTHER (SEE COMMENTS)     Seasonal allergies, Rhinitis symptoms    Pollen OTHER (SEE COMMENTS)    Seasonal OTHER (SEE COMMENTS)

## 2025-04-07 NOTE — PROGRESS NOTES
[facility]  SLEEP PROGRESS NOTE        HPI:   This is a 19 year old male coming in for   Chief Complaint   Patient presents with    Obstructive Sleep Apnea (KAREN)     Pt here for three month check up  Pt states recently mask doesn't stay on during the night .       HPI:   Lives with parents  Siblings 24, 9  Was not able to get his testing due to insurance denial  He did start taking modafinil 100mg with some effect but has not taken it recently      Going to bed at 1, OOB 9am, sleep quality about 6/10. Always tired. Difficulty napping even though very tired.  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      Pt  PCP:  Jared Holm V  No referring provider defined for this encounter.           No data to display                    Past Medical History:    Broken nose    GERD (gastroesophageal reflux disease)    Sleep apnea     History reviewed. No pertinent surgical history.  Social History:  Social History     Social History Narrative    Not on file     Social History     Socioeconomic History    Marital status: Single   Tobacco Use    Smoking status: Never    Smokeless tobacco: Never   Vaping Use    Vaping status: Never Used   Substance and Sexual Activity    Alcohol use: Not Currently    Drug use: Never     Social Drivers of Health      [facility]    Housing Stability     Family History:  Family History   Problem Relation Age of Onset    Heart Disorder Father     Dementia Father     Diabetes Mother      Allergies:  Allergies[1]  Current Meds:  Current Outpatient Medications   Medication Sig Dispense Refill    ANUCORT-HC 25 MG Rectal Suppos  INSERT 1 SUPPOSITORY RECTALLY EVERY MORNING AND BEFORE BEDTIME (Patient not taking: Reported on 4/8/2025)      modafinil 100 MG Oral Tab Take 1 tablet (100 mg total) by mouth 2 (two) times daily. Take 1 tab right away in the morning and 2nd tab in early afternoon if needed (Patient not taking: Reported on 4/8/2025) 60 tablet 2    Esomeprazole Magnesium 40 MG Oral Capsule Delayed Release Take 40 mg by mouth every morning before breakfast. (Patient not taking: Reported on 1/27/2025)        Counseling given: Not Answered         Problem List:  Patient Active Problem List   Diagnosis    Obstructive sleep apnea    Hypersomnia       REVIEW OF SYSTEMS:   Review of Systems    EXAM:   /62   Pulse 68   Resp 16   Ht 5' 9\" (1.753 m)   Wt 160 lb (72.6 kg)   SpO2 98%   BMI 23.63 kg/m²  Estimated body mass index is 23.63 kg/m² as calculated from the following:    Height as of this encounter: 5' 9\" (1.753 m).    Weight as of this encounter: 160 lb (72.6 kg).   Neck in inches:      Wt Readings from Last 6 Encounters:   04/08/25 160 lb (72.6 kg)   03/11/25 154 lb 9.6 oz (70.1 kg)   01/27/25 156 lb (70.8 kg)   12/10/24 161 lb (73 kg)   09/24/24 163 lb (73.9 kg)   08/15/23 150 lb (68 kg)     BP Readings from Last 3 Encounters:   04/08/25 102/62   01/27/25 98/50   12/10/24 106/66     Pulse Readings from Last 3 Encounters:   04/08/25 68   01/27/25 78   12/10/24 61     SpO2 Readings from Last 3 Encounters:   04/08/25 98%   01/27/25 98%   12/10/24 97%      Ideal body weight: 70.7 kg (155 lb 13.8 oz)  Adjusted ideal body weight: 71.5 kg (157 lb 8.3 oz)    Vital signs reviewed.  Physical Exam    ASSESSMENT AND PLAN:   1. Obstructive sleep apnea  Waking with mask off, needs to switch to FFM  Mask refit in office  RTC in 3 months    2. Hypersomnia  Trial modafinil 200mg BID    May consider ambien at next visit  Has not been able to have MSLT due to cost      There are no Patient Instructions on file for this visit.    Independent  interpretation of Sleep Download as defined above.  Continue with Rx management of Sleep apnea with PAP therapy.    COMPLIANCE is required by insurance for 4 hours a night most nights of the week.    Advised if still with sleep apnea and not using CPAP has a 7 fold increase in risk of heart attack, stroke, abnormal heart rhythm  and death,  increased risk of driving accidents.     Advised to refrain from driving when sleepy.      Recommend weight loss, and maintain and optimal BMI with Exercise 30 minutes most days to target heart rate .     Advised patient to change filters,masks,hoses  and tubes and equiptment on a  regular schedule.    Filters and seals shall be changed every 1 month,  Hoses every 3 months,   CPAP mask and humidifier chamber changed every 6 month  with the durable medical equipment provider.         Meds & Refills for this Visit:  Requested Prescriptions      No prescriptions requested or ordered in this encounter       Outcome: Parent verbalizes understanding. Parent is notified to call with any questions, complications, allergies, or worsening or changing symptoms.  Parent is to call with any side effects or complications from the treatments as a result of today.     \" This note was created utilizing Dragon speech recognition software.  Please excuse any grammatical errors. Call my office if you have any questions regarding this note. \"     Annabella Taylor, DO  4/8/2025  9:39 AM       [1]   Allergies  Allergen Reactions    Pollen Extract OTHER (SEE COMMENTS)     Seasonal allergies, Rhinitis symptoms    Pollen OTHER (SEE COMMENTS)    Seasonal OTHER (SEE COMMENTS)

## 2025-04-08 ENCOUNTER — OFFICE VISIT (OUTPATIENT)
Facility: CLINIC | Age: 20
End: 2025-04-08
Payer: MEDICAID

## 2025-04-08 VITALS
RESPIRATION RATE: 16 BRPM | OXYGEN SATURATION: 98 % | DIASTOLIC BLOOD PRESSURE: 62 MMHG | SYSTOLIC BLOOD PRESSURE: 102 MMHG | WEIGHT: 160 LBS | BODY MASS INDEX: 23.7 KG/M2 | HEART RATE: 68 BPM | HEIGHT: 69 IN

## 2025-04-08 DIAGNOSIS — G47.10 HYPERSOMNIA: ICD-10-CM

## 2025-04-08 DIAGNOSIS — G47.33 OBSTRUCTIVE SLEEP APNEA: Primary | ICD-10-CM

## 2025-04-08 PROCEDURE — 99215 OFFICE O/P EST HI 40 MIN: CPT | Performed by: OTHER

## 2025-04-08 NOTE — PROGRESS NOTES
Pt to try full face mask. Pt fitted with Resmed Airfit F20 medium ffm.   Pt was able to demonstrate on how to properly put on  Airfit F20 mask and how to adjust the headgear.  Pt instructed  on proper cleaning and maintenance of full face mask.   Instructed pt to update the DME company with the type of mask pt will be using so that DME will send  the right mask in pt's  next cpap re supply shipment.  Pt verbalized understanding of all instructions.

## 2025-04-10 ENCOUNTER — OFFICE VISIT (OUTPATIENT)
Facility: LOCATION | Age: 20
End: 2025-04-10

## 2025-04-10 VITALS — HEART RATE: 73 BPM | DIASTOLIC BLOOD PRESSURE: 74 MMHG | SYSTOLIC BLOOD PRESSURE: 125 MMHG

## 2025-04-10 DIAGNOSIS — G47.33 OSA (OBSTRUCTIVE SLEEP APNEA): ICD-10-CM

## 2025-04-10 DIAGNOSIS — J30.9 ALLERGIC RHINITIS, UNSPECIFIED SEASONALITY, UNSPECIFIED TRIGGER: ICD-10-CM

## 2025-04-10 DIAGNOSIS — J34.2 NASAL SEPTAL DEVIATION: Primary | ICD-10-CM

## 2025-04-10 DIAGNOSIS — R09.82 POSTNASAL DRIP: ICD-10-CM

## 2025-04-10 PROCEDURE — 99213 OFFICE O/P EST LOW 20 MIN: CPT | Performed by: OTOLARYNGOLOGY

## 2025-04-10 RX ORDER — AZELASTINE 1 MG/ML
2 SPRAY, METERED NASAL 2 TIMES DAILY
Qty: 30 ML | Refills: 1 | Status: SHIPPED | OUTPATIENT
Start: 2025-04-10

## 2025-04-10 RX ORDER — FLUTICASONE PROPIONATE 50 MCG
2 SPRAY, SUSPENSION (ML) NASAL DAILY
Qty: 16 G | Refills: 3 | Status: SHIPPED | OUTPATIENT
Start: 2025-04-10

## 2025-04-10 NOTE — PROGRESS NOTES
NEW PATIENT PROGRESS NOTE  OTOLOGY/OTOLARYNGOLOGY    REF MD:  No referring provider defined for this encounter.    PCP: Jared Holm V    CHIEF COMPLAINT:    Chief Complaint   Patient presents with    Dizziness     Patient is here is here due  to light headedness x 3 years.     Sinus Problem     Patient reports slight breathing issue, no improvement of symptoms       HISTORY OF PRESENT ILLNESS: Spenser Nguyen is a 19 year old male who presents for evaluation of difficulty breathing. Patient was seen 4 weeks ago by Dr. Hwang for difficulty breathing through the nose, reported on both sides, and leading to mouth breathing. Additional symptoms include lightheadedness, itchy and watery eyes, postnasal drip, and a runny nose. The patient has a known history of seasonal allergies.  He has a history of seasonal allergies, nasal trauma, and sleep apnea, with last study a year ago showing mild to moderate obstructive sleep apnea. He reports occasional lightheadedness due to feeling unable to take deep breaths and suspects a deviated septum. He denies facial pressure or pain and was treated with Flonase and azelastine for 4 weeks, but he did not try either.    PAST MEDICAL HISTORY:  Past Medical History[1]    PAST SURGICAL HISTORY:  Past Surgical History[2]    Medications Ordered Prior to Encounter[3]    Allergies: Allergies[4]    SOCIAL HISTORY:    Social History     Tobacco Use    Smoking status: Never    Smokeless tobacco: Never   Substance Use Topics    Alcohol use: Not Currently       Family History[5]    REVIEW OF SYSTEMS:   Positives are in bold  Neuro: Headache, facial weakness, facial numbness, neck pain, vertigo  ENT: Hearing change, tinnitus, otorrhea, otalgia, aural fullness, ear pressure, vertigo/ dizziness imbalance  Sinus pressure, rhinorrhea, PND, breathing difficulty, congestion, facial pain, jaw pain, dysphagia, odynophagia, sore throat, voice changes, shortness of breath    EXAMINATION:  I washed my hands  with an alcohol-based hand gel prior to examination  Constitutional:   --Vitals: Blood pressure 125/74, pulse 73.  General: no apparent distress, well-developed, conversant  Psych: affect pleasant and appropriate for age, alert and oriented  Neuro: Cranial nerves: EOMI, Facial sensation intact to touch, palate elevates midline, tongue protrudes midline, shoulder shrug intact bilateral, facial movement normal bilateral  Respiratory: No stridor, stertor or increased work of breathing  ENT:  --Nose: no external nasal deformity, anterior rhinoscopy: Deviated septum bilaterally, worse on right than left, no inferior turbinate hypertrophy, mucosa healthy, no rhinorrhea  --OC/OP: No trismus. No masses or lesions noted over the gingiva, buccal mucosa, Slightly enlarged tongue, FOM, hard/soft palate, tonsillar pillars, posterior pharyngeal wall. Tonsils are symmetric and soft. FOM/BOT are soft.   --Neck: No palpable cervical lymphadenopathy, no thyromegaly, no masses or lesions over the bilateral submandibular or parotid glands  --Ear: (bilateral ears were examined under binocular microscopy)  Right ear microscopic exam:  Pinna: Normal, no lesions or masses.  Mastoid: Nontender on palpation.   External auditory canal: Clear, no masses or lesions.  Tympanic membrane: Intact, no lesions, normal landmarks.  Middle ear: Aerated.    Left ear microscopic exam:  Pinna: Normal, no lesions or masses.  Mastoid: Nontender on palpation.   External auditory canal: Clear, no masses or lesions.  Tympanic membrane: Intact, no lesions, normal landmarks.  Middle ear: Aerated.    ASSESSMENT/PLAN:  Spenser Nguyen is a 19 year old male with     ICD-10-CM   1. Nasal septal deviation  J34.2   2. Allergic rhinitis, unspecified seasonality, unspecified trigger  J30.9   3. Postnasal drip  R09.82   4. KAREN (obstructive sleep apnea)  G47.33        IMPRESSION:  Bilateral nasal septal deviation, worse on right   Allergic rhinitis and postnasal drip  Mild to  moderate KAREN confirmed by home sleep study    PLAN:  -Continue Flonase and Azelastine with instructions to try one spray at a time to assess effectiveness.  -Continue flonase nasal spray, 2 sprays daily to each nostril, may take up to 6 weeks of consistent use to take effect. Proper application discussed.    -Continue Azelastine nasal spray 2 sprays twice daily - refilled  -Follow-up in 6 weeks with either Dr Hwang or myself to assess response to treatment.    Situation reviewed with the patient in detail.    Attention: This note has been scribed by Jaclyn Puente under the supervision of Jeremiah Cornelius MD.     Jeremiah Cornelius MD  Otology/Otolaryngology  [address] [place] [id number]  Phone [phone]  Fax [phone]      I have personally performed the services described in this documentation. All medical record entries made by the scribe were at my direction and in my presence. I have reviewed the chart and agree that the medical record reflects my personal performance and is accurate and complete.             [1]   Past Medical History:   Broken nose    GERD (gastroesophageal reflux disease)    Sleep apnea   [2] History reviewed. No pertinent surgical history.  [3]   Current Outpatient Medications on File Prior to Visit   Medication Sig Dispense Refill    ANUCORT-HC 25 MG Rectal Suppos INSERT 1 SUPPOSITORY RECTALLY EVERY MORNING AND BEFORE BEDTIME      modafinil 100 MG Oral Tab Take 1 tablet (100 mg total) by mouth 2 (two) times daily. Take 1 tab right away in the morning and 2nd tab in early afternoon if needed 60 tablet 2    Esomeprazole Magnesium 40 MG Oral Capsule Delayed Release Take 40 mg by mouth every morning before breakfast.       No current facility-administered medications on file prior to visit.   [4]   Allergies  Allergen Reactions    Pollen Extract OTHER (SEE COMMENTS)     Seasonal allergies, Rhinitis symptoms    Pollen  OTHER (SEE COMMENTS)    Seasonal OTHER (SEE COMMENTS)   [5]   Family History  Problem Relation Age of Onset    Heart Disorder Father     Dementia Father     Diabetes Mother

## 2025-04-29 ENCOUNTER — TELEPHONE (OUTPATIENT)
Facility: CLINIC | Age: 20
End: 2025-04-29

## 2025-04-29 DIAGNOSIS — G47.10 HYPERSOMNIA: Primary | ICD-10-CM

## 2025-04-30 NOTE — TELEPHONE ENCOUNTER
Pt was prescribed Modafinil 200mg BID, but order was not sent to pharmacy.    LOV: 04/08/2025  ASSESSMENT AND PLAN:   1. Obstructive sleep apnea  Waking with mask off, needs to switch to FFM  Mask refit in office  RTC in 3 months     2. Hypersomnia  Trial modafinil 200mg BID    [phone] (home)

## 2025-05-02 RX ORDER — MODAFINIL 200 MG/1
200 TABLET ORAL 2 TIMES DAILY
Qty: 60 TABLET | Refills: 1 | Status: SHIPPED | OUTPATIENT
Start: 2025-05-02

## 2025-05-05 ENCOUNTER — TELEPHONE (OUTPATIENT)
Facility: CLINIC | Age: 20
End: 2025-05-05

## 2025-05-05 NOTE — TELEPHONE ENCOUNTER
Prior authorization initiated for Modafinil 200 mg, [place] CASE# [id number], RX# [id number].  Authorization is pending.    [phone] ([place])

## (undated) NOTE — ED AVS SNAPSHOT
Alexiajoerioscar Torres   MRN: [id number]    Department:  [facility] Emergency Department   Date of Visit:  9/6/2019           Disclosure     Insurance plans vary and the physician(s) referred by the ER may not be covered by your plan.  Please contact your insu tell this physician (or your personal doctor if your instructions are to return to your personal doctor) about any new or lasting problems. The primary care or specialist physician will see patients referred from the [facility] Emergency Department.  Rogers Winters